# Patient Record
Sex: FEMALE | Race: WHITE
[De-identification: names, ages, dates, MRNs, and addresses within clinical notes are randomized per-mention and may not be internally consistent; named-entity substitution may affect disease eponyms.]

---

## 2018-06-26 ENCOUNTER — HOSPITAL ENCOUNTER (INPATIENT)
Dept: HOSPITAL 80 - FED | Age: 58
LOS: 4 days | Discharge: HOME | DRG: 176 | End: 2018-06-30
Attending: FAMILY MEDICINE | Admitting: FAMILY MEDICINE
Payer: COMMERCIAL

## 2018-06-26 DIAGNOSIS — K44.9: ICD-10-CM

## 2018-06-26 DIAGNOSIS — D64.9: ICD-10-CM

## 2018-06-26 DIAGNOSIS — F32.9: ICD-10-CM

## 2018-06-26 DIAGNOSIS — E66.9: ICD-10-CM

## 2018-06-26 DIAGNOSIS — E83.52: ICD-10-CM

## 2018-06-26 DIAGNOSIS — I27.20: ICD-10-CM

## 2018-06-26 DIAGNOSIS — I82.411: ICD-10-CM

## 2018-06-26 DIAGNOSIS — D69.6: ICD-10-CM

## 2018-06-26 DIAGNOSIS — N17.9: ICD-10-CM

## 2018-06-26 DIAGNOSIS — I26.99: Primary | ICD-10-CM

## 2018-06-26 DIAGNOSIS — K80.20: ICD-10-CM

## 2018-06-26 LAB
INR PPP: 1.02 (ref 0.83–1.16)
PLATELET # BLD: 141 10^3/UL (ref 150–400)
PROTHROMBIN TIME: 13.6 SEC (ref 12–15)

## 2018-06-26 RX ADMIN — ACETAMINOPHEN SCH: 500 TABLET ORAL at 23:39

## 2018-06-26 RX ADMIN — Medication SCH MG: at 22:32

## 2018-06-26 NOTE — GHP
[f 
rep st]



                                                            HISTORY AND PHYSICAL





DATE OF ADMISSION:  06/26/2018



CHIEF COMPLAINT:  Shortness of breath and near syncope.



HISTORY OF PRESENT ILLNESS:  This is a 58-year-old female, with no significant 
past medical history, presented to the emergency department today via EMS due 
to shortness of breath.  The patient states that she became more short of 
breath than usual walking to her bus stop today.  She works at [employer]. While at 
work she walked to the bathroom, where she became quite winded and felt like 
she was going to pass out.  EMS was subsequently called, and she was brought to 
the emergency department for further evaluation, where she presented with a 
room air oxygen saturation of 89%. 



The patient does not have any history of blood clots.  She has been more 
sedentary than usual, which she attributes to long standing right thigh pain 
that has been ongoing for more than a year.  She denies any chest pain.



PAST MEDICAL HISTORY:  Obesity. Suspect some underlying depression.



PAST SURGICAL HISTORY:  Laparoscopy for endometriosis.



HOME MEDICATIONS:  Reviewed.  Refer to Sulmaq for details.



ALLERGIES:  No known drug allergies.



SOCIAL HISTORY:  The patient is .  She denies any alcohol, tobacco, or 
illicit drug use.



FAMILY HISTORY:  Reviewed and noncontributory.  Negative for blood clots.



REVIEW OF SYSTEMS:  Comprehensive 10-point review of systems was done and is 
negative, except for as mentioned in the HPI.



PHYSICAL EXAM:  VITAL SIGNS:  Blood pressure 126/91, pulse of 110, respiratory 
rate 18, O2 saturation 99% on 2 L.  Room air oxygen saturation was 89% on 
initial presentation.  GENERAL:  No acute distress.  Mood is labile, with 
crying. 

EYES:  PERRLA.  Sclerae anicteric.  MOUTH:  Moist mucous membranes.  NECK:  
Supple.  No lymphadenopathy. 

CARDIOVASCULAR:  Tachycardic. S1, S2.  No JVD.  No lower extremity edema.  
PULMONARY:  Lungs are clear.  No wheezes, rales, or rhonchi.  Slightly 
tachypneic.  ABDOMEN:  Soft, nontender, nondistended.  No guarding or rebound 
tenderness.  Normoactive bowel sounds.  EXTREMITIES:  No clubbing or cyanosis.  
NEURO:  Cranial nerves 2-12 grossly intact.  No focal motor or sensory 
deficits. 

SKIN:  Clear.  No rashes.



DIAGNOSTICS:  CT angio of the chest was reviewed and was consistent with 
extensive bilateral pulmonary emboli, with evidence of right-sided heart strain
, with ectasia of the ascending aorta.  Moderate size hiatal hernia and 
cholelithiasis.  See report for full details.  EKG, which I visualized and 
personally interpreted, shows sinus tachycardia, rate 115 beats per minute.  No 
acute ischemic changes.  WBC 7, hemoglobin 13.2, hematocrit 39.9, platelets 141
, D-dimer was greater than 20, INR 1.02.  PT and PTT within normal limits.  
Sodium 140, potassium 3.6, chloride 102, BUN 32, creatinine 1.4, glucose 120, 
calcium 10.8, phosphorus 4.9, troponin elevated at 0.66.



ASSESSMENT AND PLAN:  This is a 58-year-old female with no significant past 
medical history, presenting with: 

1.  Submassive pulmonary emboli with evidence of right heart strain and 
elevated troponin.  Plan:  The patient will be admitted to the step-down unit 
on a heparin drip.  We will cycle her troponins.  Currently the patient is 
hemodynamically stable.  I discussed the case with Dr. Can who is on call for 
pulmonology who agrees with not giving TPA in the setting of hemodynamic 
stability.  

2.  Mild hypercalcemia.  Plan:  Monitor and repeat in the morning.

3.  Elevated creatinine with unknown baseline.

4.  Plan:  Continue to monitor.

5.  Longstanding right leg pain.  Plan:  The patient should have PT and OT 
started once her medical condition stabilizes.  She may benefit from outpatient 
physical therapy for treatment of her right thigh pain.  

6.  Labile mood, concerning for undiagnosed depression.  Plan:  She should 
follow up with her primary care provider on an outpatient basis to further 
discuss the possibility of underlying depression. 

The patient requests to be full code status.





Job #:  [id number]

MTDD

## 2018-06-26 NOTE — EDPHY
H & P


Time Seen by Provider: 06/26/18 17:21


HPI/ROS: 





CHIEF COMPLAINT:  Shortness of breath





HISTORY OF PRESENT ILLNESS:  The patient is a 58-year-old obese female no 

significant past medical history who comes to the emergency department 

complaining of shortness of breath with exertion.  This began this afternoon.  

She denies cough fever or flu like symptoms.  She denies chest pain.  She has 

no history of asthma or pulmonary disease.  She has not had any nausea vomiting 

or diaphoresis.  The she denies palpitations.  She denies lightheadedness.  She 

is slightly hypoxic on room air at 89%.  No recent travel.  No smoking.  no 

hormones.








REVIEW OF SYSTEMS:


Constitutional:  denies: chills, fever, recent illness, recent injury


EENTM: denies: blurred vision, double vision, nose congestion


Respiratory: denies: cough, shortness of breath


Cardiac: denies: chest pain, irregular heart rate, lightheadedness, palpitations


Gastrointestinal/Abdominal: denies: abdominal pain, diarrhea, nausea, vomiting, 

blood streaked stools


Genitourinary: denies: dysuria, frequency, hematuria, pain


Musculoskeletal: denies: joint pain, muscle pain


Skin: denies: lesions, rash, jaundice, bruising


Neurological: denies: headache, numbness, paresthesia, tingling, dizziness, 

weakness


Hematologic/Lymphatic: denies: blood clots, easy bleeding, easy bruising


Immunologic/allergic: denies: HIV/AIDS, transplant








EXAM:


GENERAL:  Well-appearing, well-nourished and in no acute distress.


HEAD:  Atraumatic, normocephalic.


EYES:  Pupils equal round and reactive to light, extraocular movements intact, 

sclera anicteric, conjunctiva are normal.


ENT:  TMs normal, nares patent, oropharynx clear without exudates.  Moist 

mucous membranes.


NECK:  Normal range of motion, supple without lymphadenopathy or JVD.


LUNGS:  Breath sounds clear to auscultation bilaterally and equal.  No wheezes 

rales or rhonchi.


HEART:  Tachycardic but regular, rhythm without murmurs, rubs or gallops.


ABDOMEN:  Soft, nontender, normoactive bowel sounds.  No guarding, no rebound.  

No masses appreciated.


BACK:  No CVA tenderness, no spinal tenderness, step-offs or deformities


EXTREMITIES:  Normal range of motion, no pitting or edema.  No clubbing or 

cyanosis.


NEUROLOGICAL:  Cranial nerves II through XII grossly intact.  Normal speech, 

normal gait.  5/5 strength, normal movement in all extremities, normal sensation


PSYCH:  Normal mood, normal affect.


SKIN:  Warm, dry, normal turgor, no visible rashes or lesions.








Source: Patient


Exam Limitations: No limitations





- Medical/Surgical History


Hx Asthma: No


Hx Chronic Respiratory Disease: No


Hx Diabetes: No


Hx Cardiac Disease: No


Hx Renal Disease: No


Hx Cirrhosis: No


Hx Alcoholism: No


Other PMH: Obesity





- Family History


Significant Family History: Heart disease





- Social History


Smoking Status: Never smoked


Alcohol Use: Sober


Drug Use: None


Constitutional: 


 Initial Vital Signs











Temperature (C)  36.7 C   06/26/18 17:31


 


Heart Rate  119 H  06/26/18 17:31


 


Respiratory Rate  16   06/26/18 17:31


 


Blood Pressure  155/101 H  06/26/18 17:31


 


O2 Sat (%)  90 L  06/26/18 17:31








 











O2 Delivery Mode               Nasal Cannula


 


O2 (L/minute)                  2














Allergies/Adverse Reactions: 


 





No Known Allergies Allergy (Unverified 06/26/18 17:30)


 








Home Medications: 














 Medication  Instructions  Recorded


 


Acetaminophen [Tylenol  mg 1,000 mg PO HS 06/26/18





(*)]  


 


Calcium Carbonate [Tums 500MG (*)] 1,000 mg PO Q2H PRN 06/26/18


 


Herbals/Supplements -Info Only 1 ea PO DAILY 06/26/18


 


Ibuprofen [Motrin (*)] 400 mg PO Q6H PRN 06/26/18


 


Melatonin [Melatonin 3 MG (*)] 3 mg PO HS 06/26/18


 


Naproxen Sodium [Aleve 220 MG (*)] 440 mg PO DAILY PRN 06/26/18














Medical Decision Making





- Diagnostics


EKG Interpretation: 





An EKG obtained and was read and documented in trace view.  Please see trace 

view for full reading and report.  Sinus tachycardia 115 


Imaging: Discussed imaging studies w/ On call Radiologist


ED Course/Re-evaluation: 





We discussed her large bilateral PEs and right heart strain.  I will start 

heparin drip.  I discussed the case with Dr. Meyers who will admit.


Differential Diagnosis: 





Partial list of the Differential diagnosis considered include but were not 

limited to;  PE, acute coronary disease and although unlikely based on the 

history and physical exam, I also considered pneumonia, asthma. 


Critical Care Time: 





Critical care time spent by me, Dr. Apple exclusive with this patient was 35 

minutes, exclusive of the PA time exclusive of procedures.  The organ system 

that was at risk was pulmonary and cardiovascular and I gave diagnostics, 

treatment and admission to prevent worsening of the patient's condition





- Data Points


Laboratory Results: 


 Laboratory Results





 06/26/18 17:30 





 06/26/18 17:30 








Medications Given: 


 





Acetaminophen (Tylenol)  1,000 mg PO HS SULMA


   Stop: 12/23/18 20:59


   Last Admin: 06/26/18 23:39 Dose:  Not Given


Heparin Sodium (Porcine) (Heparin 50 Units/Ml (Premix))  500 mls @ 0 mls/hr IV 

CONT SULMA; Per Protocol


   PRN Reason: Protocol


   Stop: 12/23/18 20:44


   Last Admin: 06/27/18 11:27 Dose:  500 mls


Sodium Chloride (Ns)  1,000 mls @ 75 mls/hr IV CONT SULMA


   Stop: 12/24/18 11:29


   Last Admin: 06/27/18 11:27 Dose:  1,000 mls


Melatonin (Melatonin)  3 mg PO HS SULMA


   Stop: 12/23/18 20:59


   Last Admin: 06/26/18 22:32 Dose:  3 mg


Oxycodone HCl (Oxycodone Ir)  5 - 10 mg PO Q4 PRN


   PRN Reason: Pain, Severe Able to Take PO


   Stop: 07/06/18 20:44


   Last Admin: 06/26/18 22:32 Dose:  5 mg





Discontinued Medications





Heparin Sodium (Porcine) (Heparin Injection)  0 unit IVP EDNOW ONE


   PRN Reason: Protocol


   Stop: 06/26/18 19:46


   Last Admin: 06/26/18 20:11 Dose:  7,000 units


Heparin Sodium (Porcine) (Heparin Injection)  0 unit IVP ONCE ONE


   PRN Reason: Protocol


   Stop: 06/26/18 20:46


   Last Admin: 06/27/18 07:26 Dose:  Not Given


Sodium Chloride (Ns)  1,000 mls @ 0 mls/hr IV EDNOW ONE; Wide Open


   PRN Reason: Protocol


   Stop: 06/26/18 17:26


   Last Admin: 06/26/18 18:08 Dose:  1,000 mls


Heparin Sodium (Porcine) (Heparin 50 Units/Ml (Premix))  500 mls @ 0 mls/hr IV 

EDNOW ONE; Per Protocol


   PRN Reason: Protocol


   Stop: 06/26/18 19:46


   Last Admin: 06/26/18 20:12 Dose:  500 mls





Point of Care Test Results: 


 Chemistry











  06/26/18





  18:10


 


POC Troponin I  0.66 ng/mL H ng/mL





   (0.00-0.08) 














Departure





- Departure


[facility]position: [facility]


Clinical Impression: 


Pulmonary embolism


Qualifiers:


 Pulmonary embolism type: other Chronicity: acute Acute cor pulmonale presence: 

without acute cor pulmonale Qualified Code(s): I26.99 - Other pulmonary 

embolism without acute cor pulmonale





Condition: Fair

## 2018-06-26 NOTE — CPEKG
Heart Rate: 115

RR Interval: 522

P-R Interval: 164

QRSD Interval: 100

QT Interval: 328

QTC Interval: 454

P Axis: 62

QRS Axis: 49

T Wave Axis: -27

EKG Severity - OTHERWISE NORMAL ECG -

EKG Impression: SINUS TACHYCARDIA

Electronically Signed By: Campos Apple 26-Jun-2018 18:01:07

## 2018-06-27 LAB
INR PPP: 1.15 (ref 0.83–1.16)
PLATELET # BLD: 105 10^3/UL (ref 150–400)
PROTHROMBIN TIME: 14.9 SEC (ref 12–15)

## 2018-06-27 RX ADMIN — SODIUM CHLORIDE SCH MLS: 900 INJECTION, SOLUTION INTRAVENOUS at 11:27

## 2018-06-27 RX ADMIN — Medication SCH MG: at 20:10

## 2018-06-27 RX ADMIN — HEPARIN SODIUM AND DEXTROSE SCH MLS: 5000; 5 INJECTION INTRAVENOUS at 11:27

## 2018-06-27 NOTE — ASMTCMCOM
CM Note

 

CM Note                       

Notes:

Patient admitted for shortness of breath and near syncope. Found to have bilateral PEs.



She lives independently with her . Works at [employer]. Therapies have been ordered to evaluate 

longstanding R leg pain. Case Management will follow. 

 

Date Signed:  06/27/2018 10:54 AM

Electronically Signed By:Gabrielle Montes RN

## 2018-06-27 NOTE — GCON
[f rep st]



                                                                    CONSULTATION





PULMONARY CRITICAL CARE CONSULTATION



DATE OF CONSULTATION:  06/27/2018



REASON FOR CONSULTATION:  Pulmonary embolism.



HISTORY OF PRESENT ILLNESS:  The patient is a pleasant 58-year-old who presented to the emergency dep
artment late yesterday with shortness of breath and fatigue.  She felt that she was going to pass out
.  She was at work.  EMS was called and she was brought to the emergency department.  CT angiogram wa
s done and showed bilateral pulmonary embolic disease distally.  There is no saddle embolism or clot 
in the right or left main pulmonary arteries.  Right heart strain cannot be excluded.  A hiatal herni
a and cholelithiasis were noted.  She was started on heparin and admitted to the intensive care unit.
  She was hypertensive on admission to the ED with a saturation of 90%.  Heart rate was 117.  She was
 afebrile. 



Following admission to the intensive care unit, she has remained stable overnight.  She is doing well
.  She denies chest pain or hemoptysis.  Vital signs have been stable.  She has been on 3 L of oxygen
.  EKG shows a normal sinus for rhythm. 



Her only risk factor for pulmonary embolic disease is inactivity.  She sits at a desk at [employer] and will
 remain stationary there sitting for as long as 4 hours.



PAST MEDICAL HISTORY:  Remarkable for obesity and possibly mild depression.



SOCIAL HISTORY:  The patient is , with 1 son.  Alcohol and tobacco are negative.  She works at
 [employer].



FAMILY HISTORY:  Negative for thromboembolic disease.



REVIEW OF SYSTEMS:  A 10-point review of systems is negative.  She has had no lower extremity edema, 
pain, dilated veins, etc.  She has had no recent travel or trauma.  There is no history of heart dise
ase.  She is quite inactive.



PHYSICAL EXAMINATION:  GENERAL:  Reveals a pleasant woman who is sitting up in a chair.  She is in no
 distress.  VITAL SIGNS:  Blood pressure is approximately 125/90, heart rate 105, with sinus tachycar
elier on the monitor.  She is on 2 L of oxygen with saturations of 99%.  Respiratory rate is 18.  She i
s afebrile.  She is in no distress.  NECK:  Unremarkable for lymphadenopathy or thyromegaly.  There i
s no obvious jugular venous distention, but the neck is large.  CHEST:  Clear bilaterally.  There are
 no rales, no rub.  Breath sounds are somewhat diminished, secondary to body habitus.  HEART:  Tachyc
ardic.  Heart tones are distant.  A gallop cannot be excluded.  P2 possibly is elevated.  ABDOMEN:  O
bese, soft, and nontender.  Bowel sounds are present.  EXTREMITIES:  Unremarkable for significant linh
ma.  There is trace plus bilaterally.  There are no obvious cords.  The right and left calves are equ
al in size.  There is no tenderness to palpation.  NEUROLOGIC:  Examination is intact.



LABORATORY/IMAGING:  CT scan of the chest is as outlined above, positive for bilateral pulmonary embo
lic disease. 



White blood cell count is 5100, hematocrit 33.8, down from approximately 40 on admission, platelets a
re 105,000.  The latest unfractionated heparin was 0.94.  D-dimer was elevated on admission at greate
r than 20.  PT and PTT were within normal limits.  Chemistries are within normal limits with the exce
ption of a BUN of 26 and creatinine of 1.2.  Troponin was elevated at 0.4 this morning.



ASSESSMENT:  Pulmonary embolism.  This is bilateral and significant; however, she was not hypotensive
 or significantly hypoxemic.  She is doing well.  Pulmonary hypertension is suspected.  A cardiac ech
o will be obtained.  Bilateral venous Doppler ultrasounds will be ordered looking for a possible lowe
r extremity source.  Heparin will be continued for now.  She will be kept bedrest to the bathroom unt
il we know about possible deep venous thrombosis.  She will be kept in the intensive care unit 



All the above was discussed with the patient, hospitalist, Nursing. 



Further plans and recommendations will be made based on her progress over the next 12 to 24 hours.





Job #:  [id number]

## 2018-06-27 NOTE — HOSPPROG
Hospitalist Progress Note


Assessment/Plan: 





59 yo female admitted with bilateral Pulmonary embolus in distal main pulmonary 

artery with right sided strain





#bilateral P.E. with right sided strain


-VSS, trop decreasing


-no indication for tpa


-cont Heparin





#Acute vs chronic renal injury





#Anemia





#Thrombocytopenia





#Obesity





#CP due to bilateral P.E.





Plan:


check LE US


Check TTE


monitor Hgb and platelets closely give Heparin drip


Cont Heparin drip


IVF


obtain urine studies 


Keep SDU for now





total critical care time is 35 minutes in a pt with acute P.E. with e/o right 

sided strain, elevated troponin, on a heparin drip.





Subjective: still had CP last night. No CP this morning. No SOB. Reports 

bilateral leg swelling. she says its chronic.


Objective: 


 Vital Signs











Temp Pulse Resp BP Pulse Ox


 


 36.4 C   92   14   108/63   97 


 


 06/27/18 07:35  06/27/18 07:35  06/27/18 07:35  06/27/18 07:35  06/27/18 07:35








 Laboratory Results





 06/27/18 05:50 





 06/27/18 05:50 





 











 06/26/18 06/27/18 06/28/18





 05:59 05:59 05:59


 


Intake Total  1133 


 


Output Total  2 


 


Balance  1131 








 











PT  14.9 SEC (12.0-15.0)   06/27/18  02:30    


 


INR  1.15  (0.83-1.16)   06/27/18  02:30    














- Physical Exam


Constitutional: no apparent distress


Eyes: PERRL, EOMI


Ears, Nose, Mouth, Throat: moist mucous membranes, hearing normal, ears appear 

normal


Cardiovascular: regular rate and rhythym, edema (trace)


Respiratory: clear to auscultation


Gastrointestinal: normoactive bowel sounds, soft, non-tender abdomen


Skin: warm


Musculoskeletal: full muscle strength


Neurologic: AAOx3


Psychiatric: interacting appropriately, not anxious, not encephalopathic


Lymph, Heme, Immunologic: No petechiae





ICD10 Worksheet


Patient Problems: 


 Problems











Problem Status Onset


 


Pulmonary embolism Acute

## 2018-06-27 NOTE — PDMN
Medical Necessity


Medical necessity: MCG M-290, Pulmonary Embolism, A-4 days, Pt admitted w SOB 

and near syncope; found to have extensive, bilat PE w/R heart strain and 

elevated trop, On IV heparin, requiring ongoing monitoring and treatment

## 2018-06-27 NOTE — ECHO
[email or web address]





[address] [address] 

Main: [phone] 



Fax: 



Transthoracic Echocardiogram 

Name:             ELVER BARRERA                              MR#:

[id number]

Study Date:       2018                                Study

Time:     11:22 AM

YOB: 1960                                Age:

58 year(s)

Height:           160 cm (63 in.)                           Weight:

114.76 kg (253 lb.)

BSA:              2.14 m2                                   Gender:

Female

Examination:      Echo

Indication:     Bilateral PE

Image Quality:                                              Contrast: 

Requested by:     Nayan Weaver                              BP:

105 mmHg/66 mmHg

Heart Rate:                                                 Rhythm:

Normal sinus rhythm

Indication:       Bilateral PE 



Procedure Staff 

Ultrasound Technician:   Srinivas Anguiano RDCS 

Reading Physician:       Yuniel Travis MD 

Requesting Provider: 



Conclusions:           No pericardial effusion. Preserved left

ventricular systolic function with ejection fraction of 68%.

Dilated right ventricle with a flattened interventricular septum

consistent with right ventricular

pressure and/or volume overload. Elevated right ventricular systolic

pressure. Moderate tricuspid

regurgitation. Tissue Doppler suggests normal left ventricular filling

pressures.



Measurements: 

Chambers                     Valvular Assessment AV/MV

Valvular Assessment TV/PV



Normal                                    Normal

Normal

Name         Value      Range              Name         Value Range

Name           Value Range

Ao Marialuisa (MM): 2.6 cm     (2.2 cm-3.7            AV Vmax:     1.29 m/s

(1 m/s-1.7        TR Vmax:       3.17 mm/s ( - )



cm)                                   m/s)             TR PGmax:

40 mmHg ( - )

IVSd (2D):   0.9 cm (0.6 cm-1.1                AV maxP mmHg ( -

)           syst. PAP: 50 mmHg   ( - )



cm)                LVOT Vmax:   0.85 m/s (0.7 m/s-1.1      PV Vmax:

0.84 m/s (0.6 m/s-0.9

LVDd (2D):   3.8 cm     (3.9 cm-5.3                               m/s)

m/s)



cm)                MV E Vmax:   0.50 m/s ( - )         PV PGmax:

3  mmHg ( - )

LVDs (2D):   2.4 cm     (2.1 cm-4              MV A Vmax:   0.88 m/s (

- )



cm)                MV E/A:      0.57  ( - )  

LVPWd (2D):  1.0 cm     ( - )   

LVEF (2D):   68         (>=54 %)   

RVDd(2D):    4.5 cm     (1.9 cm-3.8 



cmmm)  



Continued Measurements: 

Chambers                     Valvular Assessment AV/MV

Valvular Assessment TV/PV



Name                      Value            Name

Value      Name                     Value

LADs Lon.5 cm                 MV E' Septal:

0.10  m/s   CVP (est.):             10 mmHg

LA Area:                13.0 cm2           MV E/E' Septal:

5.20



MV E/E' Lateral:        7.00   



Patient: ELVER BARRERA                           MRN: [id number]

Study Date: 2018   Page 1 of 2

11:22 AM 









Findings: Left Ventricle: 

Normal size left ventricle. No LV hypertrophy. Normal global systolic

LV function. EF is 68 %. No

regional wall motion abnormality. Diastolic dysfunction is present. .



Right Ventricle: 

Mildly to moderately dilated right ventricle. Mildly reduced RV

function. Flattened interventricular

septum consistent with right ventricular pressure and/or volume

overload free wall.

Left Atrium: 

The left atrium is normal in size.  

Right Atrium: 

The right atrium is normal in size.  

Mitral Valve: 

The mitral valve is normal in appearance and function. Mild mitral

valve leaflet calcification is

present. There is no mitral valve regurgitation.  

Aortic Valve: 

The aortic valve is tri-leaflet. The aortic valve is normal in

appearance and function.

Tricuspid Valve: 

Moderate tricuspid regurgitation is present. The pulmonary artery

pressure is moderately increased.

Pulmonic Valve: 

The pulmonic valve is normal in appearance and function.  

Aorta: 

The aorta is normal.  

Pericardium: 

No pericardial effusion. 







Electronically signed by Yuniel Travis MD on 2018 at 12:25 PM 

(No Signature Object) 



Patient: ELVER BARRERA                           MRN: [id number]

Study Date: 2018   Page 2 of 2

11:22 AM 







[id number]

## 2018-06-28 LAB — PLATELET # BLD: 85 10^3/UL (ref 150–400)

## 2018-06-28 RX ADMIN — ACETAMINOPHEN SCH: 500 TABLET ORAL at 00:33

## 2018-06-28 RX ADMIN — SODIUM CHLORIDE SCH MLS: 900 INJECTION, SOLUTION INTRAVENOUS at 00:32

## 2018-06-28 RX ADMIN — Medication SCH MG: at 17:37

## 2018-06-28 RX ADMIN — ACETAMINOPHEN SCH: 500 TABLET ORAL at 22:34

## 2018-06-28 RX ADMIN — WARFARIN SODIUM SCH MG: 7.5 TABLET ORAL at 15:33

## 2018-06-28 NOTE — HOSPPROG
Hospitalist Progress Note


Assessment/Plan: 





59 yo female admitted with bilateral Pulmonary embolus with right sided strain





#bilateral P.E. with right sided strain, right proximal LE DVT


-VSS, trop decreasing


-no indication for tpa


-Start Warfarin today


-consider changing Heparin to Lovenox tomorrow per pulms reccs





#Acute vs chronic renal injury, resolved with IVF





#Anemia, decreasing, monitoring





#Thrombocytopenia, decreasing, monitoring





#Obesity





#CP due to bilateral P.E., resolved





Plan:


-cont Heparin


-Start Coumadin


-monitor platelets and Hgb


-PT/OT


-Stop IVF


-transfer out of the SDU





Subjective: no resp issues. no sob. no cp. bp is stable. Hgb and plateles 

decreasing. very emotional


Objective: 


 Vital Signs











Temp Pulse Resp BP Pulse Ox


 


 36.8 C   77   12   125/60 H  100 


 


 06/28/18 08:00  06/28/18 08:00  06/28/18 08:00  06/28/18 08:00  06/28/18 08:00








 Laboratory Results





 06/28/18 06:15 





 06/28/18 06:15 





 











 06/27/18 06/28/18 06/29/18





 05:59 05:59 05:59


 


Intake Total 1133 2497 


 


Output Total 2 400 


 


Balance 1131 2097 








 











PT  14.9 SEC (12.0-15.0)   06/27/18  02:30    


 


INR  1.15  (0.83-1.16)   06/27/18  02:30    














- Physical Exam


Constitutional: no apparent distress


Eyes: PERRL, EOMI


Ears, Nose, Mouth, Throat: moist mucous membranes, hearing normal


Cardiovascular: regular rate and rhythym, edema (trace bilateral)


Respiratory: no respiratory distress


Gastrointestinal: normoactive bowel sounds, soft, non-tender abdomen


Skin: warm


Neurologic: AAOx3


Psychiatric: interacting appropriately, not anxious, not encephalopathic


Lymph, Heme, Immunologic: No petechiae





ICD10 Worksheet


Patient Problems: 


 Problems











Problem Status Onset


 


Pulmonary embolism Acute

## 2018-06-28 NOTE — PDINTPN
Intensivist Progress Note


Assessment/Plan: 


Assessment:





DVT right lower extremity associated with moderate volume pulmonary embolism 

bilaterally.  On a heparin drip.  Will start Coumadin today.  With extensive 

right lower extremity DVT despite the relative lack of symptoms I would favor 

keeping her primarily at bed rest with activity limited to chair/bathroom for a 

total of 72 hr from initiation of anticoagulation.





PE:  Associated with moderate pulmonary hypertension.  Clinically doing well.  

Blood pressure an oxygen requirements both are fine.





History of snoring.  She could have underlying sleep apnea.  Outpatient 

evaluation will be needed.  Overnight oximetry done here prior to discharge may 

indicate need for oxygen at night.





Obesity.














Plan:  Continue heparin drip.  Start Coumadin today.  Follow INR.  May be able 

[facility] tomorrow and consider discharge on Saturday?  

Overnight oximetry study in the hospital on room air tomorrow night/Friday.  

Limit activity to bed, chair comma bathroom for now.








Subjective: 





Doing well, feels pretty well.  Denies shortness of breath.  No cough, no chest 

pain, no hemoptysis.  She has some right thigh pain when she is up.  This is 

been present for years, perhaps worse in the last 2-3 weeks?


Objective: 





 Vital Signs











Temp Pulse Resp BP Pulse Ox


 


 36.8 C   77   12   125/60 H  100 


 


 06/28/18 08:00  06/28/18 08:00  06/28/18 08:00  06/28/18 08:00  06/28/18 08:00








 Laboratory Results





 06/28/18 06:15 





 06/28/18 06:15 





 











 06/27/18 06/28/18 06/29/18





 05:59 05:59 05:59


 


Intake Total 1133 2497 


 


Output Total 2 400 


 


Balance 1131 2097 








 











PT  14.9 SEC (12.0-15.0)   06/27/18  02:30    


 


INR  1.15  (0.83-1.16)   06/27/18  02:30    








Moderate pulmonary hypertension present on echo.  Dilated RV secondary to 

pressure/volume overload.





Right lower extremity venous Doppler ultrasound positive for clot.  Femoral, 

saphenous, etc veins involved





Physical Exam





- Physical Exam


General Appearance: alert, no apparent distress, obese


EENT: PERRL/EOMI, other (On room air currently.  With 2 L saturations were 100%)


Neck: normal inspection (No obvious JVD.  Large neck.), No thyromegaly


Respiratory: lungs clear, decreased breath sounds (Secondary to body habitus), 

No rales, No rhonchi, No pleural rub


Cardiac/Chest: regular rate, rhythm (Distant heart tones.)


Abdomen: normal bowel sounds, non-tender, soft (Obese)


Extremities: pedal edema (Trace + pedal edema bilaterally.  No obvious cords or 

tenderness in the calf on the right.  Calf size on the right is the same as the 

left.)


Neuro/Psych: no motor/sensory deficits, No cognition abnormalities





ICD10 Worksheet


Patient Problems: 


 Problems











Problem Status Onset


 


Pulmonary embolism Acute

## 2018-06-29 LAB
INR PPP: 1.03 (ref 0.83–1.16)
PLATELET # BLD: 102 10^3/UL (ref 150–400)
PROTHROMBIN TIME: 13.7 SEC (ref 12–15)

## 2018-06-29 RX ADMIN — HEPARIN SODIUM AND DEXTROSE SCH MLS: 5000; 5 INJECTION INTRAVENOUS at 03:36

## 2018-06-29 RX ADMIN — WARFARIN SODIUM SCH MG: 7.5 TABLET ORAL at 16:36

## 2018-06-29 RX ADMIN — ACETAMINOPHEN SCH MG: 500 TABLET ORAL at 20:37

## 2018-06-29 RX ADMIN — Medication SCH MG: at 20:37

## 2018-06-29 RX ADMIN — ENOXAPARIN SODIUM SCH MG: 150 INJECTION SUBCUTANEOUS at 18:21

## 2018-06-29 RX ADMIN — CALCIUM CARBONATE (ANTACID) CHEW TAB 500 MG PRN MG: 500 CHEW TAB at 03:35

## 2018-06-29 NOTE — HOSPPROG
Hospitalist Progress Note


Assessment/Plan: 





59 yo female admitted with bilateral Pulmonary embolus with right sided strain





#bilateral P.E. with right sided strain, right proximal LE DVT


-VSS, trop decreasing


-no indication for tpa


-cont Warfarin


-Change Heparin to Lovenox





#Acute vs chronic renal injury, resolved with IVF





#Anemia, stable, monitoring





#Thrombocytopenia, stable, monitoring





#Obesity





#CP due to bilateral P.E., resolved





Plan:


-start Lovenox


-cont oumadin


-monitor platelets and Hgb


-PT/OT


-Hopefully d/c soon





Subjective: no cp or sob. no n/v


Objective: 


 Vital Signs











Temp Pulse Resp BP Pulse Ox


 


 36.8 C   80   16   102/75   97 


 


 06/29/18 15:42  06/29/18 15:42  06/29/18 15:42  06/29/18 15:42  06/29/18 15:42








 Laboratory Results





 06/29/18 04:50 





 06/28/18 06:15 





 











 06/28/18 06/29/18 06/30/18





 05:59 05:59 05:59


 


Intake Total 2497 1760 


 


Output Total 400  


 


Balance 2097 1760 








 











PT  13.7 SEC (12.0-15.0)   06/29/18  04:50    


 


INR  1.03  (0.83-1.16)   06/29/18  04:50    














- Physical Exam


Constitutional: no apparent distress


Eyes: PERRL, EOMI


Ears, Nose, Mouth, Throat: moist mucous membranes, hearing normal


Cardiovascular: regular rate and rhythym, edema


Respiratory: no respiratory distress, no rales or rhonchi, clear to auscultation


Gastrointestinal: normoactive bowel sounds


Skin: warm


Neurologic: AAOx3


Psychiatric: interacting appropriately, not anxious, not encephalopathic


Lymph, Heme, Immunologic: No petechiae





ICD10 Worksheet


Patient Problems: 


 Problems











Problem Status Onset


 


Pulmonary embolism Acute

## 2018-06-30 VITALS — DIASTOLIC BLOOD PRESSURE: 57 MMHG | SYSTOLIC BLOOD PRESSURE: 152 MMHG

## 2018-06-30 LAB
INR PPP: 1.03 (ref 0.83–1.16)
PLATELET # BLD: 113 10^3/UL (ref 150–400)
PROTHROMBIN TIME: 13.7 SEC (ref 12–15)

## 2018-06-30 RX ADMIN — ENOXAPARIN SODIUM SCH MG: 150 INJECTION SUBCUTANEOUS at 05:52

## 2018-06-30 RX ADMIN — CALCIUM CARBONATE (ANTACID) CHEW TAB 500 MG PRN MG: 500 CHEW TAB at 17:09

## 2018-06-30 RX ADMIN — WARFARIN SODIUM SCH MG: 7.5 TABLET ORAL at 17:09

## 2018-06-30 RX ADMIN — ENOXAPARIN SODIUM SCH MG: 150 INJECTION SUBCUTANEOUS at 17:09

## 2018-06-30 NOTE — SOAPPROG
SOAP Progress Note


Assessment/Plan: 


Assessment:





DVT right lower extremity associated with moderate volume pulmonary embolism 

bilaterally.  On enoxaparin bridge and Coumadin.  For discharge later today.  

Next INR on Monday.





PE:  Associated with moderate pulmonary hypertension.  Clinically doing well.  

Blood pressure and oxygen requirements both are fine.  She will need a follow-

up echo at 3 months





History of snoring.  She could have underlying sleep apnea.  Outpatient 

evaluation will be needed.  





Obesity.














Plan:  For discharge home today on Lovenox and Coumadin.  INR in 2 days, on 

Monday.  Results will be called to the office.  She will call the office for 

further instructions regarding continuing or not the enoxaparin, dose of 

Coumadin, and next INR.  At some point I will transition her INR checks to the 

coagulation clinic.  For follow-up in the office in 2-4 weeks.











Subjective: 





Feels better.  Denies shortness of breath or chest pain.  Dyspnea on exertion 

hard to evaluate as she is fairly inactive.  Her chronic right thigh pain seems 

to be better.


Objective: 





 Vital Signs











Temp Pulse Resp BP Pulse Ox


 


 37.0 C   78   16   152/57 H  97 


 


 06/30/18 15:40  06/30/18 15:40  06/30/18 15:40  06/30/18 15:40  06/30/18 15:40








 Laboratory Results





 06/30/18 04:48 





 06/28/18 06:15 





 











 06/29/18 06/30/18 07/01/18





 05:59 05:59 05:59


 


Intake Total 1760 350 


 


Balance 1760 350 








 











PT  13.7 SEC (12.0-15.0)   06/30/18  04:48    


 


INR  1.03  (0.83-1.16)   06/30/18  04:48    














Physical Exam





- Physical Exam


General Appearance: alert, no apparent distress, obese


EENT: PERRL/EOMI, other (On room air)


Neck: normal inspection (Large neck)


Respiratory: lungs clear, decreased breath sounds (Secondary to body habitus), 

No rales, No rhonchi, No pleural rub


Cardiac/Chest: regular rate, rhythm (Distant heart tones)


Abdomen: normal bowel sounds, non-tender, soft (Significantly overweight)


Skin: normal color, warm/dry


Extremities: pedal edema, swelling (Large legs bilaterally.  Size about equal 

right verses left)


Neuro/Psych: no motor/sensory deficits (Does walk with a limp related to her 

right leg), No cognition abnormalities





ICD10 Worksheet


Patient Problems: 


 Problems











Problem Status Onset


 


Pulmonary embolism Acute

## 2018-06-30 NOTE — PDDCSUM
Discharge Summary


Discharge Summary: 





This is a 59 yo female admitted with bilateral Pulmonary embolus with right 

sided strain. She was admitted into the SDU. She was started on Heparin. She 

was not hemodynamically unstable. He did not have hypotension. Thrombolytics 

were not used. She was eventually transitioned to Lovenox and Warfarin. Her INR 

is not therapeutic. She will cont with Coumadin and a Lovenox bridge. She will 

have her INR obtained on Monday with results to Dr. Nayan Weaver who will 

manage her follow up. At f/u would repeat a CBC





Please see below for details per problem list.





DDX:


#bilateral P.E. with right sided strain,





#RLE DVT, proxima





#Acute vs chronic renal injury, resolved with IVF





#Anemia, stable, monitoring





#Thrombocytopenia, stable, monitoring





#Obesity





#CP due to bilateral P.E., resolved





Exam:


VSS, RA


NAD


AAOX3


RRR


CTA B


S/NT/ND


NO LE EDEMA





MEDS: SEE MED REC





TOTAL TIME SPENT ON D/C IS 35 MINS

## 2018-06-30 NOTE — ASMTLACE
LACE

 

Length of stay for            Answers:  4-6 days                              

current admission                                                             

Acuity / Level of             Answers:  Yes                                   

Care: Did the patient                                                         

have an inpatient                                                             

admission?                                                                    

# of Emergency department     Answers:  1-2                                   

visits in the last 6                                                          

months                                                                        

Social determinants           Answers:  Mental health diagnosis               

                                        (anxiety, depression, pers            

                                        onality disorders, etc.)              

Score: 11

 

Date Signed:  06/30/2018 03:25 PM

Electronically Signed By:Catrina Otra RN

## 2018-06-30 NOTE — ASMTCMCOM
CM Note

 

CM Note                       

Notes:

Spoke w/MD, pt has no insurance and no current PCP. She needs coumadin and Lovenox, MD sent RXs to 

her [facility], for 4 Lovenox syringes it would be $77.56 (they have a discount card for 


her), a 30 day supply of Coumadin will be about $13.74. 



D/w MD, rx written for 1 weeks worth of Lovenox (14 syringes), understands pt may not be able to 

get all at once but would at least like her to get six.



CM discussed discharge plan with pt and  on speaker phone, they confirm they can get afford 

RXs and will pick them up tonight. She will have an INR drawn on Monday at [facility] and follow up with 


Dr Nayan Flores office for instructions. 



I also encourgaged pt to get established with a PCP at [facility] in [place], that now that she 

is on Coumadin it will be important to have a doctor. Pt stated she will call Monday to schedule 

appt.



Patient will give herself evening dose before she leaves tonight,  will pick her up at 5pm.



DC Plan: Independent

 

Date Signed:  06/30/2018 03:20 PM

Electronically Signed By:Catrina Orta RN